# Patient Record
Sex: FEMALE | Race: WHITE | URBAN - METROPOLITAN AREA
[De-identification: names, ages, dates, MRNs, and addresses within clinical notes are randomized per-mention and may not be internally consistent; named-entity substitution may affect disease eponyms.]

---

## 2017-12-26 ENCOUNTER — THERAPY VISIT (OUTPATIENT)
Dept: PHYSICAL THERAPY | Facility: CLINIC | Age: 27
End: 2017-12-26
Payer: COMMERCIAL

## 2017-12-26 DIAGNOSIS — M54.2 CERVICALGIA: Primary | ICD-10-CM

## 2017-12-26 PROCEDURE — 97014 ELECTRIC STIMULATION THERAPY: CPT | Mod: GP | Performed by: PHYSICAL THERAPIST

## 2017-12-26 PROCEDURE — 97161 PT EVAL LOW COMPLEX 20 MIN: CPT | Mod: GP | Performed by: PHYSICAL THERAPIST

## 2017-12-26 NOTE — PROGRESS NOTES
[facility] Initial Evaluation  Subjective:  HPI                    Objective:  System    Physical Exam    General     [facility]     Physical Therapy Initial Evaluation:   Dec 26, 2017  Therapist Impression: Lizbeth is a 27 year-old female who presents to physical therapy with complaints of severe neck pain that is worse on the right side than the left. The symptoms started more mildly about 1.5 weeks ago while doing push-ups. They worsened two nights ago following a pop in the neck while laying down. Clinical findings include major decreased in cervical range of motion limited by pain, increase pain with attempted manual therapies, and decreased tolerance for upright positioning due to pain. At this time, Lizbeth would benefit from other interventions to reduce pain and improve tolerance for physical therapy which would be beneficial for reducing pain, restoring motion, and improving cervical stability for prevention of relapse of symptoms in the future.       Subjective:   Chief Complaint:    Pain: Posterior neck pain from the skull down along the upper trap. Worse on the right than the left. Radiating pain to the elbow when it popped   Numbness/Tingling: None   Weakness: None   Stiffness: Unable to move the neck   Other: Frontal headaches, possibly into temples  New/Recurrent/Chronic: New  DOI/onset: 12/16/2017, worse on 12/25/2017  Referral Date: Self Referred  Mechanism of onset: Initially while doing a push-up. 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) Job duties: prolonged standing, lifting/carrying,   Previous Treatment (Effect): Chiropractic helped a little bit, was generally getting better), heat (feels a little better)  Imaging: None  AM/PM: same all day, maybe a little more stiff in the morning  Quality  of Pain: sharp  Pain: 8/10 at present, 0/10 at best, 10/10 at worst  Better: shower/heat, not moving  Worse: breathing, sitting up for an extended period of time  Progression of Symptoms since onset: Worse   Sleeping: Sleeps for about an hour on and off, about 3 hours of sleep on average   Other current functional challenges: Looking around, driving  Current Functional Status: Looking around - Unable to turn head to drive   Previous Functional Status: Looking around - no restrictions  Live with Others: Home for the holidays, then back to her house (has a roommate, not around much)  Red Flags:   - Patient denies the following: Fever ; Weakness ; Numbness/Tingling ; Change in Bowel or Bladder ; Chest Pain ;   - Patient reports the following: Pain with Cough / Sneeze / Laughing (radiates to the right shoulder) ; Night Pain ;      Objective:    Posture: Mild forward head posture    Headaches: Reports frontal headaches, possible radiation into the temples.     AROM: (Major, Moderate, Minimal or Nil loss)  Movement Loss Gregory Mod Min Nil Pain   Flexion x    cc   Extension x    cc   Left Rotation x    cc   Right Rotation  x   cc   Left Side Bending x    cc   Right Side bending  x   cc     Cervical PIVMs: (R- right, L - left; U - upglide, D - downglide)  Level Hypo Pain   C1/2     C2/3     C3/4 AARTI    C4/5 AARTI    C5/6 AARTI    C6/7 AARTI    C7/T1 AARTI        SPECIAL TESTS   R L   Distraction (-)    Alar Ligament Test (-) (-)   Transverse Ligament Test (-)      Other:   - Tenderness without tension in the trapezius, SCM, cervical paraspinals; right more than left  - Pain worse with soft tissue work  - Pain worse with manual distraction  - Pain worse with range of motion exercises      Assessment/Plan:    Patient is a 27 year old female with cervical complaints.    Patient has the following significant findings with corresponding treatment plan.                Diagnosis 1:  Right-sided neck pain  Pain -  hot/cold therapy, US,  electric stimulation, manual therapy, splint/taping/bracing/orthotics, self management, education, directional preference exercise and home program  Decreased ROM/flexibility - manual therapy, therapeutic exercise, therapeutic activity and home program  Decreased function - therapeutic activities and home program  Impaired posture - neuro re-education, therapeutic activities and home program    Therapy Evaluation Codes:   1) History comprised of:   Personal factors that impact the plan of care:      Other life stressors.    Comorbidity factors that impact the plan of care are:      None.     Medications impacting care: None.  2) Examination of Body Systems comprised of:   Body structures and functions that impact the plan of care:      Cervical spine, Head and Shoulder.   Activity limitations that impact the plan of care are:      Driving and Sleeping.  3) Clinical presentation characteristics are:   Unstable/Unpredictable.  4) Decision-Making    Low complexity using standardized patient assessment instrument and/or measureable assessment of functional outcome.  Cumulative Therapy Evaluation is: Low complexity.    Previous and current functional limitations:  (See Goal Flow Sheet for this information)    Short term and Long term goals: (See Goal Flow Sheet for this information)     Communication ability:  Patient appears to be able to clearly communicate and understand verbal and written communication and follow directions correctly.  Treatment Explanation - The following has been discussed with the patient:   RX ordered/plan of care  Anticipated outcomes  Possible risks and side effects  Given patient's pain level and tolerance for attempted PT interventions, PT intervention is not appropriate at this time.    Patient would benefit from additional evaluation and intervention to reduce pain and allow better tolerance for PT before continuing.     Goals formulated without dates. Dates will be set when patient returns  to PT.     Please refer to the daily flowsheet for treatment today, total treatment time and time spent performing 1:1 timed codes.

## 2017-12-27 PROBLEM — M54.2 CERVICALGIA: Status: ACTIVE | Noted: 2017-12-27

## 2018-01-25 ENCOUNTER — THERAPY VISIT (OUTPATIENT)
Dept: PHYSICAL THERAPY | Facility: CLINIC | Age: 28
End: 2018-01-25
Payer: COMMERCIAL

## 2018-01-25 DIAGNOSIS — M54.2 CERVICALGIA: ICD-10-CM

## 2018-01-25 PROCEDURE — 97110 THERAPEUTIC EXERCISES: CPT | Mod: GP | Performed by: PHYSICAL THERAPIST

## 2018-01-25 PROCEDURE — 97140 MANUAL THERAPY 1/> REGIONS: CPT | Mod: GP | Performed by: PHYSICAL THERAPIST

## 2018-01-25 NOTE — PROGRESS NOTES
Subjective:  HPI                    Objective:  System    Physical Exam    General     ROS    Assessment/Plan:    PROGRESS/DISCHARGE  REPORT    Progress reporting period is from 12/26/2017 to 1/25/2018.       SUBJECTIVE  Subjective changes noted by patient: Still has some pain with certain head movements, but mostly just feels stiff. Still wakes up some nights, but is able to fall back asleep. Driving is no longer an issue.      Current Pain level: 0/10.     Initial Pain level: 10/10.   Changes in function:  Yes (See Goal flowsheet attached for changes in current functional level)  Adverse reaction to treatment or activity: None    OBJECTIVE  Changes noted in objective findings:  Yes, Patient demonstrates improvements in ROM and pain, as well as tissue tension.       AROM: (Major, Moderate, Minimal or Nil loss)  Movement Loss Gregory Mod Min Nil Pain   Flexion    x cc   Extension    x    Left Rotation    x    Right Rotation    x    Left Side Bending    x    Right Side bending    x    Retraction    x      Cervical PIVMs: (R- right, L - left; U - upglide, D - downglide)  Level Hypo Pain   C1/2     C2/3 RU, ANASTASIA, RD, LD cc   C3/4     C4/5     C5/6     C6/7     C7/T1       Deep Neck Flexor Activation: Good control of incremental deep flexor activation without strength deficit.       ASSESSMENT/PLAN  Updated problem list and treatment plan: Diagnosis 1:  Cervicalgia  STG/LTGs have been met or progress has been made towards goals:  Yes (See Goal flow sheet completed today.)  Assessment of Progress: The patient's condition is improving.  Self Management Plans:  Patient is independent in a home treatment program.  Patient is independent in self management of symptoms.  Lizbeth continues to require the following intervention to meet STG and LTG's:  PT intervention is no longer required to meet STG/LTG.    Recommendations:  This patient is ready to be discharged from therapy and continue their home treatment program.    Please  refer to the daily flowsheet for treatment today, total treatment time and time spent performing 1:1 timed codes.

## 2018-02-20 PROBLEM — M54.2 CERVICALGIA: Status: RESOLVED | Noted: 2017-12-27 | Resolved: 2018-02-20

## 2018-05-23 ENCOUNTER — VIRTUAL VISIT (OUTPATIENT)
Dept: FAMILY MEDICINE | Facility: OTHER | Age: 28
End: 2018-05-23

## 2018-05-23 ENCOUNTER — TELEPHONE (OUTPATIENT)
Dept: FAMILY MEDICINE | Facility: CLINIC | Age: 28
End: 2018-05-23

## 2018-05-23 NOTE — TELEPHONE ENCOUNTER
Clinic Action Needed:No  Reason for Call: Pt calling back as [email or web address] unable to treat her symptoms over the phone. Advised an Urgent Care now since can not take time off of work during regular clinic hours.  resources given.   Routed to: None    Michaela Morgan RN, [facility]

## 2018-05-23 NOTE — TELEPHONE ENCOUNTER
[facility] or evisit per staff would like to be determined by triage.     Vaginal Symptoms  Onset: couple days ago    Description:  Vaginal Discharge: white   Itching (Pruritis): YES  Burning sensation:  no   Odor: YES- slight    Accompanying Signs & Symptoms:  Pain with Urination: no   Abdominal Pain: no   Fever: no     History:   Sexually active: YES  New Partner: no   Possibility of Pregnancy:  No    Precipitating factors:   Recent Antibiotic Use: YES- last antibiotic was in March for strep    Alleviating factors:  nothing    Therapies Tried and outcome: nothing has been tried    Per protocol and pt choice, pt wants to do an [facility] visit tonight.  Advised pt of information.  This does qualify for this type of visit.    The patient indicates understanding of these issues and agrees with the plan.  Uma Funes RN  [facility]

## 2018-05-24 ENCOUNTER — NURSE TRIAGE (OUTPATIENT)
Dept: NURSING | Facility: CLINIC | Age: 28
End: 2018-05-24

## 2018-05-24 ENCOUNTER — OFFICE VISIT (OUTPATIENT)
Dept: URGENT CARE | Facility: URGENT CARE | Age: 28
End: 2018-05-24
Payer: COMMERCIAL

## 2018-05-24 VITALS
HEART RATE: 78 BPM | SYSTOLIC BLOOD PRESSURE: 112 MMHG | WEIGHT: 149 LBS | TEMPERATURE: 98 F | OXYGEN SATURATION: 99 % | DIASTOLIC BLOOD PRESSURE: 78 MMHG | BODY MASS INDEX: 24.79 KG/M2

## 2018-05-24 DIAGNOSIS — N89.8 VAGINAL ITCHING: Primary | ICD-10-CM

## 2018-05-24 LAB
ALBUMIN UR-MCNC: NEGATIVE MG/DL
APPEARANCE UR: CLEAR
BILIRUB UR QL STRIP: NEGATIVE
COLOR UR AUTO: YELLOW
GLUCOSE UR STRIP-MCNC: NEGATIVE MG/DL
HGB UR QL STRIP: NEGATIVE
KETONES UR STRIP-MCNC: ABNORMAL MG/DL
LEUKOCYTE ESTERASE UR QL STRIP: NEGATIVE
NITRATE UR QL: NEGATIVE
PH UR STRIP: 7.5 PH (ref 5–7)
SOURCE: ABNORMAL
SP GR UR STRIP: 1.01 (ref 1–1.03)
SPECIMEN SOURCE: NORMAL
UROBILINOGEN UR STRIP-ACNC: 0.2 EU/DL (ref 0.2–1)
WET PREP SPEC: NORMAL

## 2018-05-24 PROCEDURE — 87210 SMEAR WET MOUNT SALINE/INK: CPT | Performed by: PHYSICIAN ASSISTANT

## 2018-05-24 PROCEDURE — 81003 URINALYSIS AUTO W/O SCOPE: CPT | Performed by: PHYSICIAN ASSISTANT

## 2018-05-24 PROCEDURE — 99203 OFFICE O/P NEW LOW 30 MIN: CPT | Performed by: PHYSICIAN ASSISTANT

## 2018-05-24 ASSESSMENT — ENCOUNTER SYMPTOMS
HEADACHES: 0
VOMITING: 0
DYSURIA: 0
SHORTNESS OF BREATH: 0
FEVER: 0
DIARRHEA: 0
FREQUENCY: 0
NAUSEA: 0
CHILLS: 0
COUGH: 0
HEMATURIA: 0

## 2018-05-24 NOTE — MR AVS SNAPSHOT
"              After Visit Summary   2018    Lizbeth Jamil    MRN: [id number]           Patient Information     Date Of Birth          1990        Visit Information        Provider Department      2018 5:00 PM Shelley Smith PA-C [facility]        Today's Diagnoses     Vaginal itching    -  1       Follow-ups after your visit        Who to contact     If you have questions or need follow up information about today's clinic visit or your schedule please contact [facility] directly at [phone].  Normal or non-critical lab and imaging results will be communicated to you by [employer], letter or phone within 4 business days after the clinic has received the results. If you do not hear from us within 7 days, please contact the clinic through [employer] or phone. If you have a critical or abnormal lab result, we will notify you by phone as soon as possible.  Submit refill requests through [employer] or call your pharmacy and they will forward the refill request to us. Please allow 3 business days for your refill to be completed.          Additional Information About Your Visit        MyChart Information     [employer] lets you send messages to your doctor, view your test results, renew your prescriptions, schedule appointments and more. To sign up, go to [email or web address] . Click on \"Log in\" on the left side of the screen, which will take you to the Welcome page. Then click on \"Sign up Now\" on the right side of the page.     You will be asked to enter the access code listed below, as well as some personal information. Please follow the directions to create your username and password.     Your access code is: [email or web address]  Expires: 2018 11:25 AM     Your access code will  in 90 days. If you need help or a new code, please call your [facility] clinic or [phone].        Care EveryWhere ID     This is your Care EveryWhere ID. This could be used by " other organizations to access your [place] medical records  [id number]        Your Vitals Were     Pulse Temperature Pulse Oximetry BMI (Body Mass Index)          78 98  F (36.7  C) (Oral) 99% 24.79 kg/m2         Blood Pressure from Last 3 Encounters:   05/24/18 112/78   06/09/16 118/76    Weight from Last 3 Encounters:   05/24/18 149 lb (67.6 kg)   06/09/16 149 lb (67.6 kg)   04/22/14 130 lb (59 kg)              We Performed the Following     *UA reflex to Microscopic and Culture ([place] and [place] [facility] (except [place] and [place])     Wet prep        Primary Care Provider Office Phone # Fax #    Kam Barrera [phone]       [facility] [address] [facility] [address]        Equal Access to Services     JUAN VANN : Hadii aad ku hadasho Soomaali, waaxda luqadaha, qaybta kaalmada adeegyada, feliberto chandler hayaan julio ruiz . So [facility] [phone].    ATENCIÓN: Si habla español, tiene a castillo disposición servicios gratuitos de asistencia lingüística. Llame al [phone].    We comply with applicable federal civil rights laws and [place] laws. We do not discriminate on the basis of race, color, national origin, age, disability, sex, sexual orientation, or gender identity.            Thank you!     Thank you for choosing [facility]  for your care. Our goal is always to provide you with excellent care. Hearing back from our patients is one way we can continue to improve our services. Please take a few minutes to complete the written survey that you may receive in the mail after your visit with us. Thank you!             Your Updated Medication List - Protect others around you: Learn how to safely use, store and throw away your medicines at www.disposemymeds.org.          This list is accurate as of 5/24/18 11:59 PM.  Always use your most recent med list.                   Brand Name Dispense Instructions for use Diagnosis    CELEXA PO       Take 20 mg by mouth daily        DEPO-PROVERA 150 MG/ML injection   Generic drug:  medroxyPROGESTERone      Inject 150 mg into the muscle every 3 months        HYDROcodone-acetaminophen 5-325 MG per tablet    NORCO    12 tablet    Take 1-2 tablets by mouth every 4 hours as needed for other (Moderate to Severe Pain)    S/P nasal septoplasty       PROBIOTIC DAILY PO      Take 1 tablet by mouth daily        VITAMIN D (CHOLECALCIFEROL) PO      Take 1,000 Units by mouth daily        ZOFRAN PO      Take 4 mg by mouth every 4 hours as needed for nausea or vomiting        ZYRTEC ALLERGY PO      Take 10 mg by mouth daily

## 2018-05-24 NOTE — PROGRESS NOTES
SUBJECTIVE:   Lizbeth Jamil is a 27 year old female presenting with a chief complaint of   Chief Complaint   Patient presents with     Urgent Care     Vaginal Itching       She is a new patient of [place].    GYN Complaint  Onset of symptoms was 2 day(s) ago.  Course of illness is same.    Severity mild  Current and Associated symptoms: vaginal discharge described as clear and white, mild vaginal itching  Denies: fever, abdominal pain, nausea, vomiting, diarrhea, dysuria,  hematuria  Treatment measures tried include:  None  Sexually active: yes, single partner, contraception - IUD  Predisposing factors: None  Hx of previous symptom: none        Review of Systems   Constitutional: Negative for chills and fever.   Respiratory: Negative for cough and shortness of breath.    Gastrointestinal: Negative for abdominal pain, diarrhea, nausea and vomiting.   Genitourinary: Positive for vaginal discharge. Negative for dysuria, frequency, hematuria and vaginal pain.   Skin: Negative for rash.   Neurological: Negative for headaches.       Past Medical History:   Diagnosis Date     BILLIE (generalized anxiety disorder)      History of Clostridium difficile colitis      IBS (irritable bowel syndrome)      PONV (postoperative nausea and vomiting)      Recurrent sinus infections      History reviewed. No pertinent family history.  Current Outpatient Prescriptions   Medication Sig Dispense Refill     Cetirizine HCl (ZYRTEC ALLERGY PO) Take 10 mg by mouth daily       Citalopram Hydrobromide (CELEXA PO) Take 20 mg by mouth daily       HYDROcodone-acetaminophen (NORCO) 5-325 MG per tablet Take 1-2 tablets by mouth every 4 hours as needed for other (Moderate to Severe Pain) (Patient not taking: Reported on 5/24/2018) 12 tablet 0     medroxyPROGESTERone (DEPO-PROVERA) 150 MG/ML injection Inject 150 mg into the muscle every 3 months        Ondansetron HCl (ZOFRAN PO) Take 4 mg by mouth every 4 hours as needed for nausea or vomiting        Probiotic Product (PROBIOTIC DAILY PO) Take 1 tablet by mouth daily       VITAMIN D, CHOLECALCIFEROL, PO Take 1,000 Units by mouth daily        Social History   Substance Use Topics     Smoking status: Never Smoker     Smokeless tobacco: Never Used     Alcohol use Yes      Comment: SOCIALLY       OBJECTIVE  /78  Pulse 78  Temp 98  F (36.7  C) (Oral)  Wt 149 lb (67.6 kg)  SpO2 99%  BMI 24.79 kg/m2    Physical Exam   Constitutional: She appears well-developed and well-nourished. No distress.   HENT:   Head: Normocephalic and atraumatic.   Right Ear: External ear normal.   Left Ear: External ear normal.   Mouth/Throat: Oropharynx is clear and moist. No oropharyngeal exudate.   Eyes: Conjunctivae and EOM are normal.   Neck: Normal range of motion. Neck supple.   Cardiovascular: Regular rhythm and normal heart sounds.    Pulmonary/Chest: Effort normal and breath sounds normal. No respiratory distress. She has no wheezes. She has no rales.   Abdominal: Soft. Bowel sounds are normal. She exhibits no distension and no mass. There is no tenderness. There is no rebound and no guarding.   Neurological: She is alert.   Skin: Skin is warm and dry.   Psychiatric: She has a normal mood and affect.       Labs:  No results found for this or any previous visit (from the past 24 hour(s)).        ASSESSMENT:      ICD-10-CM    1. Vaginal itching L29.8 Wet prep     *UA reflex to Microscopic and Culture ([place] and [facility] (except [place] and [place])        Medical Decision Making:    Differential Diagnosis:  Gyn Problem: Vaginitis:  yeast, trichomonas vaginalis, bacterial vaginosis    Serious Comorbid Conditions:  Adult:  None    PLAN:  Vaginitis: Wet prep is negative tonight. Urinalysis not suggestive of infection. Keep monitoring symptoms. Watchful waiting. Recommended to push fluids. Azo OTC prn. Follow up if any worsening symptoms. Patient understands and agrees with the plan.    Followup:    If not  improving or if condition worsens, follow up with your Primary Care Provider

## 2018-05-24 NOTE — TELEPHONE ENCOUNTER
Patient seen in urgent care, left an hour ago, was seen for vaginal itching.    Patient states she did a self vaginal swab while there and had some urine dribble out when she did that.   Since that time Patient has been having dysuria and urinary frequency.   Denies hematuria, abdominal or back pain. Denies fever.     Has urinated a few times and isn't sure if she is emptying her bladder fully.     Is almost home (driving now) and is going to try to urinate when she gets home.    This RN will call the Patient back after speaking with the urgent care.     Spoke with Oanh at the [facility] urgent care and advised her of Patient's symptoms. Oanh spoke with the provider Shelley that saw the Patient today. Shelley advised that the urinary symptoms are probably due to irritation from the vaginal swab and that she should start feeling better after a few hours.   Advised to push fluids. Advised to be seen by PCP if symptoms do not improve.     This information was called to the Patient. Has just gotten home, has not tried urinating again. She states that it's very uncomfortable when she urinates. Rates pain 7/10 during urination. When not urinating is feeling a lot of urgency.   Patient is comfortable with pushing fluids and monitoring for now.    Advised to call back if further questions or concerns.         Reason for Disposition    Urinating more frequently than usual (i.e., frequency)    Additional Information    Negative: Shock suspected (e.g., cold/pale/clammy skin, too weak to stand, low BP, rapid pulse)    Negative: Sounds like a life-threatening emergency to the triager    Negative: [1] Unable to urinate (or only a few drops) > 4 hours AND     [2] bladder feels very full (e.g., palpable bladder or strong urge to urinate)    Negative: [1] Decreased urination and [2] drinking very little AND [2] dehydration suspected (e.g., dark urine, no urine > 12 hours, very dry mouth, very lightheaded)    Negative: Patient  sounds very sick or weak to the triager    Negative: Fever > 100.5 F (38.1 C)    Negative: Side (flank) or lower back pain present    Protocols used: URINARY SYMPTOMS-ADULT-AH

## 2018-05-25 ASSESSMENT — ENCOUNTER SYMPTOMS: ABDOMINAL PAIN: 0

## 2018-05-28 NOTE — PROGRESS NOTES
"Date:   Clinician: Whit Park  Clinician NPI: [id number]  Patient: Lizbeth Jamil  Patient : 1990  Patient Address: [address] [place] [address]  Patient Phone: [phone]  Visit Protocol: Yeast infection  Patient Summary:  Lizbeth is a 27 year old ( : 1990 ) female who initiated a Visit for a presumed vaginal yeast infection. When asked the question \"Please sign me up to receive news, health information and promotions from [employer].\", Lizbeth responded \"No\".    0-5 days ago, she began noticing perivulvar pruritus and vaginal discharge.   She denies having open sores, perivulvar rash, vaginal pruritus, and abdominal pain. She also denies feeling feverish.   She has had two (2) occurrences in the past year and the current symptoms are similar to previous yeast infections. She has not tried to treat her current symptoms with any medication.   She has a more than normal amount of smooth, clear or white, thick, malodorous discharge.   She denies taking antibiotics in the past 2 weeks. She prefers a fluconazole (Diflucan) Pill.   She denies pregnancy and denies breastfeeding. She does not menstruate.   She denies risk factors for sexually transmitted infections. She does NOT smoke or use smokeless tobacco.   Additional information as reported by the patient (free text): I do not think it is a yeast infection, it is a bacterial infection. I have had two bacterial infections since 2018, but there was not an option for bacteria. The symptoms are the same.   MEDICATIONS: [{\"id\"=&gt;[id number], \"description\"=&gt;\"Probiotic-Digestive Enzymes oral\"}], ALLERGIES: [{\"id\"=&gt;[id number], \"description\"=&gt;\"ciprofloxacin\"}]  Clinician Response:  Dear Lizbeth,   Your health is our priority. To determine the most appropriate care for you, I would like you to be seen in person to further discuss your health history and symptoms.  You will not be charged for this Visit. Thank you " for trusting us with your care.   Diagnosis: Refer for additional evaluation  Diagnosis ICD: R69  Additional Clinician Notes: Unfortunately we are only able to treat yeast via this platform.   Please be evaluated in a clinic.

## 2018-06-12 ENCOUNTER — HOSPITAL LABORATORY (OUTPATIENT)
Dept: OTHER | Facility: CLINIC | Age: 28
End: 2018-06-12

## 2018-06-14 LAB
BACTERIA SPEC CULT: NORMAL
Lab: NORMAL
SPECIMEN SOURCE: NORMAL

## 2018-09-20 ENCOUNTER — OFFICE VISIT (OUTPATIENT)
Dept: FAMILY MEDICINE | Facility: CLINIC | Age: 28
End: 2018-09-20
Payer: COMMERCIAL

## 2018-09-20 VITALS
TEMPERATURE: 97 F | SYSTOLIC BLOOD PRESSURE: 124 MMHG | OXYGEN SATURATION: 99 % | DIASTOLIC BLOOD PRESSURE: 80 MMHG | BODY MASS INDEX: 23.14 KG/M2 | WEIGHT: 144 LBS | HEART RATE: 78 BPM | HEIGHT: 66 IN

## 2018-09-20 DIAGNOSIS — N89.8 VAGINAL DISCHARGE: Primary | ICD-10-CM

## 2018-09-20 LAB
ALBUMIN UR-MCNC: NEGATIVE MG/DL
APPEARANCE UR: CLEAR
BILIRUB UR QL STRIP: NEGATIVE
COLOR UR AUTO: YELLOW
GLUCOSE UR STRIP-MCNC: NEGATIVE MG/DL
HGB UR QL STRIP: NEGATIVE
KETONES UR STRIP-MCNC: NEGATIVE MG/DL
LEUKOCYTE ESTERASE UR QL STRIP: NEGATIVE
NITRATE UR QL: NEGATIVE
PH UR STRIP: 7 PH (ref 5–7)
SOURCE: NORMAL
SP GR UR STRIP: 1.01 (ref 1–1.03)
SPECIMEN SOURCE: NORMAL
UROBILINOGEN UR STRIP-ACNC: 0.2 EU/DL (ref 0.2–1)
WET PREP SPEC: NORMAL

## 2018-09-20 PROCEDURE — 99213 OFFICE O/P EST LOW 20 MIN: CPT | Performed by: INTERNAL MEDICINE

## 2018-09-20 PROCEDURE — 81003 URINALYSIS AUTO W/O SCOPE: CPT | Performed by: INTERNAL MEDICINE

## 2018-09-20 PROCEDURE — 87591 N.GONORRHOEAE DNA AMP PROB: CPT | Performed by: INTERNAL MEDICINE

## 2018-09-20 PROCEDURE — 87491 CHLMYD TRACH DNA AMP PROBE: CPT | Performed by: INTERNAL MEDICINE

## 2018-09-20 PROCEDURE — 87210 SMEAR WET MOUNT SALINE/INK: CPT | Performed by: INTERNAL MEDICINE

## 2018-09-20 RX ORDER — CLINDAMYCIN PHOSPHATE 20 MG/G
1 CREAM VAGINAL AT BEDTIME
Qty: 40 G | Refills: 0 | Status: SHIPPED | OUTPATIENT
Start: 2018-09-20 | End: 2018-09-27

## 2018-09-20 RX ORDER — FLUCONAZOLE 150 MG/1
150 TABLET ORAL ONCE
Qty: 1 TABLET | Refills: 0 | Status: SHIPPED | OUTPATIENT
Start: 2018-09-20 | End: 2018-09-20

## 2018-09-20 ASSESSMENT — ENCOUNTER SYMPTOMS
DYSURIA: 0
FEVER: 0
FREQUENCY: 1

## 2018-09-20 ASSESSMENT — PATIENT HEALTH QUESTIONNAIRE - PHQ9: 5. POOR APPETITE OR OVEREATING: SEVERAL DAYS

## 2018-09-20 ASSESSMENT — ANXIETY QUESTIONNAIRES
IF YOU CHECKED OFF ANY PROBLEMS ON THIS QUESTIONNAIRE, HOW DIFFICULT HAVE THESE PROBLEMS MADE IT FOR YOU TO DO YOUR WORK, TAKE CARE OF THINGS AT HOME, OR GET ALONG WITH OTHER PEOPLE: NOT DIFFICULT AT ALL
3. WORRYING TOO MUCH ABOUT DIFFERENT THINGS: NOT AT ALL
2. NOT BEING ABLE TO STOP OR CONTROL WORRYING: NOT AT ALL
7. FEELING AFRAID AS IF SOMETHING AWFUL MIGHT HAPPEN: NOT AT ALL
5. BEING SO RESTLESS THAT IT IS HARD TO SIT STILL: NOT AT ALL
1. FEELING NERVOUS, ANXIOUS, OR ON EDGE: SEVERAL DAYS
GAD7 TOTAL SCORE: 2
6. BECOMING EASILY ANNOYED OR IRRITABLE: NOT AT ALL

## 2018-09-20 NOTE — MR AVS SNAPSHOT
"              After Visit Summary   9/20/2018    Lizbeth Jamil    MRN: [id number]           Patient Information     Date Of Birth          1990        Visit Information        Provider Department      9/20/2018 4:15 PM Brook Martinez MD [facility]        Today's Diagnoses     Vaginal discharge    -  1       Follow-ups after your visit        Who to contact     If you have questions or need follow up information about today's clinic visit or your schedule please contact [facility] directly at [phone].  Normal or non-critical lab and imaging results will be communicated to you by MyChart, letter or phone within 4 business days after the clinic has received the results. If you do not hear from us within 7 days, please contact the clinic through MyChart or phone. If you have a critical or abnormal lab result, we will notify you by phone as soon as possible.  Submit refill requests through [employer] or call your pharmacy and they will forward the refill request to us. Please allow 3 business days for your refill to be completed.          Additional Information About Your Visit        Care EveryWhere ID     This is your Care EveryWhere ID. This could be used by other organizations to access your [facility] medical records  [id number]        Your Vitals Were     Pulse Temperature Height Pulse Oximetry BMI (Body Mass Index)       78 97  F (36.1  C) (Tympanic) 5' 5.5\" (1.664 m) 99% 23.6 kg/m2        Blood Pressure from Last 3 Encounters:   09/20/18 124/80   05/24/18 112/78   06/09/16 118/76    Weight from Last 3 Encounters:   09/20/18 144 lb (65.3 kg)   05/24/18 149 lb (67.6 kg)   06/09/16 149 lb (67.6 kg)              We Performed the Following     *UA reflex to Microscopic and Culture ([facility] (except [place] and [place])     Chlamydia trachomatis PCR     Neisseria gonorrhoeae PCR     Wet prep          Today's Medication Changes        "   These changes are accurate as of 9/20/18  5:08 PM.  If you have any questions, ask your nurse or doctor.               Start taking these medicines.        Dose/Directions    clindamycin 2 % cream   Commonly known as:  CLEOCIN   Used for:  Vaginal discharge   Started by:  Brook Martinez MD        Dose:  1 applicator   Place 1 applicator vaginally At Bedtime for 7 days   Quantity:  40 g   Refills:  0       fluconazole 150 MG tablet   Commonly known as:  DIFLUCAN   Used for:  Vaginal discharge   Started by:  Brook Martinez MD        Dose:  150 mg   Take 1 tablet (150 mg) by mouth once for 1 dose   Quantity:  1 tablet   Refills:  0            Where to get your medicines      These medications were sent to [facility] [place], [place] - [address] [address]     Phone:  [phone]     clindamycin 2 % cream    fluconazole 150 MG tablet                Primary Care Provider Office Phone # Fax #    [facility] [phone]       [facility] [address] [facility] [address]        Equal Access to Services     [facility]: Hadii aad ku hadasho Soomaali, waaxda luqadaha, qaybta kaalmada adeegyasharon, feliberto ruiz . So [facility] [phone].    ATENCIÓN: Si habla español, tiene a castillo disposición servicios gratuitos de asistencia lingüística. [facility] [phone].    We comply with applicable federal civil rights laws and [place] laws. We do not discriminate on the basis of race, color, national origin, age, disability, sex, sexual orientation, or gender identity.            Thank you!     Thank you for choosing [facility]  for your care. Our goal is always to provide you with excellent care. Hearing back from our patients is one way we can continue to improve our services. Please take a few minutes to complete the written survey that you may receive in the mail after your visit with us.  Thank you!             Your Updated Medication List - Protect others around you: Learn how to safely use, store and throw away your medicines at www.disposemymeds.org.          This list is accurate as of 9/20/18  5:08 PM.  Always use your most recent med list.                   Brand Name Dispense Instructions for use Diagnosis    CELEXA PO      Take 20 mg by mouth daily        clindamycin 2 % cream    CLEOCIN    40 g    Place 1 applicator vaginally At Bedtime for 7 days    Vaginal discharge       DEPO-PROVERA 150 MG/ML injection   Generic drug:  medroxyPROGESTERone      Inject 150 mg into the muscle every 3 months        fluconazole 150 MG tablet    DIFLUCAN    1 tablet    Take 1 tablet (150 mg) by mouth once for 1 dose    Vaginal discharge       HYDROcodone-acetaminophen 5-325 MG per tablet    NORCO    12 tablet    Take 1-2 tablets by mouth every 4 hours as needed for other (Moderate to Severe Pain)    S/P nasal septoplasty       PHENTERMINE HCL PO           PROBIOTIC DAILY PO      Take 1 tablet by mouth daily        VITAMIN D (CHOLECALCIFEROL) PO      Take 1,000 Units by mouth daily        ZOFRAN PO      Take 4 mg by mouth every 4 hours as needed for nausea or vomiting        ZYRTEC ALLERGY PO      Take 10 mg by mouth daily

## 2018-09-20 NOTE — PROGRESS NOTES
SUBJECTIVE:   Lizbeth Jamil is a 28 year old female presenting with a chief complaint of   Chief Complaint   Patient presents with     Vaginal Problem     Pt in clinic to have eval for vaginal discharge.       She is an established patient of [facility].    GYN Complaint    Onset of symptoms was 1 week(s) ago.    Current and Associated symptoms: vaginal discharge described as white and thick and odor  Occasional itching  Treatment measures tried include:  None  Sexually active: yes, single partner, contraception - IUD  Predisposing factors: None  Hx of previous symptom:  hx UTI, bacterial vaginosis & yeast        Review of Systems   Constitutional: Negative for fever.   Genitourinary: Positive for frequency. Negative for dysuria.       Past Medical History:   Diagnosis Date     BILLIE (generalized anxiety disorder)      History of Clostridium difficile colitis      IBS (irritable bowel syndrome)      PONV (postoperative nausea and vomiting)      Recurrent sinus infections      No family history on file.  Current Outpatient Prescriptions   Medication Sig Dispense Refill     Cetirizine HCl (ZYRTEC ALLERGY PO) Take 10 mg by mouth daily       Citalopram Hydrobromide (CELEXA PO) Take 20 mg by mouth daily       clindamycin (CLEOCIN) 2 % cream Place 1 applicator vaginally At Bedtime for 7 days 40 g 0     fluconazole (DIFLUCAN) 150 MG tablet Take 1 tablet (150 mg) by mouth once for 1 dose 1 tablet 0     PHENTERMINE HCL PO        HYDROcodone-acetaminophen (NORCO) 5-325 MG per tablet Take 1-2 tablets by mouth every 4 hours as needed for other (Moderate to Severe Pain) (Patient not taking: Reported on 5/24/2018) 12 tablet 0     medroxyPROGESTERone (DEPO-PROVERA) 150 MG/ML injection Inject 150 mg into the muscle every 3 months        Ondansetron HCl (ZOFRAN PO) Take 4 mg by mouth every 4 hours as needed for nausea or vomiting       Probiotic Product (PROBIOTIC DAILY PO) Take 1 tablet by mouth daily       VITAMIN D,  "CHOLECALCIFEROL, PO Take 1,000 Units by mouth daily        Social History   Substance Use Topics     Smoking status: Never Smoker     Smokeless tobacco: Never Used     Alcohol use Yes      Comment: SOCIALLY       OBJECTIVE  /80  Pulse 78  Temp 97  F (36.1  C) (Tympanic)  Ht 5' 5.5\" (1.664 m)  Wt 144 lb (65.3 kg)  SpO2 99%  BMI 23.6 kg/m2    Physical Exam   Constitutional: She appears well-developed and well-nourished.   Abdominal: Soft. There is no tenderness.   Musculoskeletal:   No cva tenderness   Vitals reviewed.      Labs:  Results for orders placed or performed in visit on 09/20/18 (from the past 24 hour(s))   *UA reflex to Microscopic and Culture ([facility] (except [place] and [place])   Result Value Ref Range    Color Urine Yellow     Appearance Urine Clear     Glucose Urine Negative NEG^Negative mg/dL    Bilirubin Urine Negative NEG^Negative    Ketones Urine Negative NEG^Negative mg/dL    Specific Gravity Urine 1.015 1.003 - 1.035    Blood Urine Negative NEG^Negative    pH Urine 7.0 5.0 - 7.0 pH    Protein Albumin Urine Negative NEG^Negative mg/dL    Urobilinogen Urine 0.2 0.2 - 1.0 EU/dL    Nitrite Urine Negative NEG^Negative    Leukocyte Esterase Urine Negative NEG^Negative    Source Midstream Urine    Wet prep   Result Value Ref Range    Specimen Description Vagina     Wet Prep No clue cells seen     Wet Prep No yeast seen     Wet Prep No Trichomonas seen            ASSESSMENT:      ICD-10-CM    1. Vaginal discharge N89.8 Chlamydia trachomatis PCR     Neisseria gonorrhoeae PCR     *UA reflex to Microscopic and Culture ([facility] (except [place] and [place])     Wet prep     fluconazole (DIFLUCAN) 150 MG tablet     clindamycin (CLEOCIN) 2 % cream        Medical Decision Making:    Differential Diagnosis:  Gyn Problem: Vaginitis:  yeast, bacterial vaginosis, UTI      PLAN:  treatment for presumed yeast & bacterial vaginosis       Followup:    If not " improving or if condition worsens, follow up with your Primary Care Provider

## 2018-09-21 ASSESSMENT — ANXIETY QUESTIONNAIRES: GAD7 TOTAL SCORE: 2

## 2018-09-21 ASSESSMENT — PATIENT HEALTH QUESTIONNAIRE - PHQ9: SUM OF ALL RESPONSES TO PHQ QUESTIONS 1-9: 2

## 2018-09-23 LAB
C TRACH DNA SPEC QL NAA+PROBE: NEGATIVE
N GONORRHOEA DNA SPEC QL NAA+PROBE: NEGATIVE
SPECIMEN SOURCE: NORMAL
SPECIMEN SOURCE: NORMAL

## 2020-01-13 ENCOUNTER — HOSPITAL LABORATORY (OUTPATIENT)
Dept: OTHER | Facility: CLINIC | Age: 30
End: 2020-01-13

## 2020-01-17 LAB
BACTERIA SPEC CULT: ABNORMAL
Lab: ABNORMAL
SPECIMEN SOURCE: ABNORMAL

## 2021-09-14 PROCEDURE — 87070 CULTURE OTHR SPECIMN AEROBIC: CPT | Mod: ORL | Performed by: OTOLARYNGOLOGY

## 2021-09-15 ENCOUNTER — LAB REQUISITION (OUTPATIENT)
Dept: LAB | Facility: CLINIC | Age: 31
End: 2021-09-15
Payer: COMMERCIAL

## 2021-09-15 DIAGNOSIS — J30.89 OTHER ALLERGIC RHINITIS: ICD-10-CM

## 2021-09-17 LAB — BACTERIA SPEC CULT: NORMAL

## 2022-05-20 ENCOUNTER — LAB REQUISITION (OUTPATIENT)
Dept: LAB | Facility: CLINIC | Age: 32
End: 2022-05-20
Payer: COMMERCIAL

## 2022-05-20 DIAGNOSIS — J32.0 CHRONIC MAXILLARY SINUSITIS: ICD-10-CM

## 2022-05-20 PROCEDURE — 87185 SC STD ENZYME DETCJ PER NZM: CPT | Mod: ORL | Performed by: OTOLARYNGOLOGY

## 2022-05-23 LAB
BACTERIA SPEC CULT: ABNORMAL
BACTERIA SPEC CULT: ABNORMAL

## 2022-06-10 ENCOUNTER — LAB REQUISITION (OUTPATIENT)
Dept: LAB | Facility: CLINIC | Age: 32
End: 2022-06-10
Payer: COMMERCIAL

## 2022-06-10 DIAGNOSIS — J32.0 CHRONIC MAXILLARY SINUSITIS: ICD-10-CM

## 2022-06-10 PROCEDURE — 87077 CULTURE AEROBIC IDENTIFY: CPT | Mod: ORL | Performed by: OTOLARYNGOLOGY

## 2022-06-14 LAB
BACTERIA SPEC CULT: ABNORMAL
BACTERIA SPEC CULT: ABNORMAL

## 2022-11-02 ENCOUNTER — LAB REQUISITION (OUTPATIENT)
Dept: LAB | Facility: CLINIC | Age: 32
End: 2022-11-02
Payer: COMMERCIAL

## 2022-11-02 DIAGNOSIS — J32.0 CHRONIC MAXILLARY SINUSITIS: ICD-10-CM

## 2022-11-02 PROCEDURE — 87077 CULTURE AEROBIC IDENTIFY: CPT | Mod: ORL | Performed by: OTOLARYNGOLOGY

## 2022-11-07 LAB
BACTERIA SPEC CULT: ABNORMAL
BACTERIA SPEC CULT: ABNORMAL